# Patient Record
Sex: MALE | Race: WHITE
[De-identification: names, ages, dates, MRNs, and addresses within clinical notes are randomized per-mention and may not be internally consistent; named-entity substitution may affect disease eponyms.]

---

## 2018-03-08 ENCOUNTER — HOSPITAL ENCOUNTER (OUTPATIENT)
Age: 73
End: 2018-03-08
Payer: MEDICARE

## 2018-03-08 DIAGNOSIS — N40.1: ICD-10-CM

## 2018-03-08 DIAGNOSIS — R06.09: Primary | ICD-10-CM

## 2018-03-08 DIAGNOSIS — Z12.5: ICD-10-CM

## 2018-03-08 LAB
ALBUMIN LEVEL: 3.8 GM/DL (ref 3.4–5)
ALBUMIN/GLOB SERPL: 1 {RATIO} (ref 1.1–1.8)
ALP ISO SERPL-ACNC: 97 U/L (ref 46–116)
ALT SERPLBLD-CCNC: 33 U/L (ref 12–78)
ANION GAP SERPL CALC-SCNC: 10.7 MEQ/L (ref 5–15)
AST SERPL QL: 16 U/L (ref 15–37)
BILIRUBIN,TOTAL: 0.4 MG/DL (ref 0.2–1)
BUN SERPL-MCNC: 12 MG/DL (ref 7–18)
CALCIUM SPEC-MCNC: 8.9 MG/DL (ref 8.5–10.1)
CHLORIDE SPEC-SCNC: 102 MMOL/L (ref 98–107)
CO2 SERPL-SCNC: 31 MMOL/L (ref 21–32)
CREAT BLD-SCNC: 0.87 MG/DL (ref 0.7–1.3)
ESTIMATED GLOMERULAR FILT RATE: 86 ML/MIN (ref 60–?)
GFR (AFRICAN AMERICAN): 104 ML/MIN (ref 60–?)
GLOBULIN SER CALC-MCNC: 3.7 GM/DL (ref 1.3–3.2)
GLUCOSE: 101 MG/DL (ref 74–106)
HCT VFR BLD CALC: 50.7 % (ref 42–52)
HGB BLD-MCNC: 15.6 G/DL (ref 14.1–18)
MCHC RBC-ENTMCNC: 30.8 G/DL (ref 31.8–35.4)
MCV RBC: 94.8 FL (ref 80–94)
MEAN CORPUSCULAR HEMOGLOBIN: 29.2 PG (ref 27–31.2)
PLATELET # BLD: 342 K/MM3 (ref 142–424)
POTASSIUM: 4.7 MMOL/L (ref 3.5–5.1)
PROT SERPL-MCNC: 7.5 GM/DL (ref 6.4–8.2)
RBC # BLD AUTO: 5.35 M/MM3 (ref 4.6–6.2)
SODIUM SPEC-SCNC: 139 MMOL/L (ref 136–145)
TSH SERPL-ACNC: 0.57 UIU/ML (ref 0.36–3.74)
WBC # BLD AUTO: 5.6 K/MM3 (ref 4.8–10.8)

## 2018-03-08 PROCEDURE — 85025 COMPLETE CBC W/AUTO DIFF WBC: CPT

## 2018-03-08 PROCEDURE — 84443 ASSAY THYROID STIM HORMONE: CPT

## 2018-03-08 PROCEDURE — 71046 X-RAY EXAM CHEST 2 VIEWS: CPT

## 2018-03-08 PROCEDURE — 80053 COMPREHEN METABOLIC PANEL: CPT

## 2018-03-08 PROCEDURE — 36415 COLL VENOUS BLD VENIPUNCTURE: CPT

## 2018-03-08 PROCEDURE — G0103 PSA SCREENING: HCPCS

## 2018-04-17 ENCOUNTER — HOSPITAL ENCOUNTER (OUTPATIENT)
Age: 73
End: 2018-04-17
Payer: MEDICARE

## 2018-04-17 DIAGNOSIS — R06.09: Primary | ICD-10-CM

## 2018-04-17 PROCEDURE — 78452 HT MUSCLE IMAGE SPECT MULT: CPT

## 2018-04-17 PROCEDURE — A9502 TC99M TETROFOSMIN: HCPCS

## 2018-04-17 PROCEDURE — 93017 CV STRESS TEST TRACING ONLY: CPT

## 2018-05-04 ENCOUNTER — HOSPITAL ENCOUNTER (OUTPATIENT)
Age: 73
End: 2018-05-04
Payer: MEDICARE

## 2018-05-04 DIAGNOSIS — R60.0: Primary | ICD-10-CM

## 2018-05-04 PROCEDURE — 93306 TTE W/DOPPLER COMPLETE: CPT

## 2018-05-08 ENCOUNTER — HOSPITAL ENCOUNTER (OUTPATIENT)
Age: 73
End: 2018-05-08
Payer: MEDICARE

## 2018-05-08 DIAGNOSIS — G47.30: Primary | ICD-10-CM

## 2018-05-08 DIAGNOSIS — R94.39: ICD-10-CM

## 2018-05-08 DIAGNOSIS — R06.02: ICD-10-CM

## 2018-05-08 DIAGNOSIS — Z82.49: ICD-10-CM

## 2018-05-08 DIAGNOSIS — R06.83: ICD-10-CM

## 2018-05-08 PROCEDURE — 95806 SLEEP STUDY UNATT&RESP EFFT: CPT

## 2018-08-02 ENCOUNTER — HOSPITAL ENCOUNTER (OUTPATIENT)
Dept: HOSPITAL 22 - PT | Age: 73
Discharge: HOME | End: 2018-08-02
Payer: MEDICARE

## 2018-08-02 DIAGNOSIS — I87.2: Primary | ICD-10-CM

## 2018-08-02 PROCEDURE — 97140 MANUAL THERAPY 1/> REGIONS: CPT

## 2018-08-02 PROCEDURE — 97162 PT EVAL MOD COMPLEX 30 MIN: CPT

## 2018-08-02 PROCEDURE — 97760 ORTHOTIC MGMT&TRAING 1ST ENC: CPT

## 2018-12-17 ENCOUNTER — HOSPITAL ENCOUNTER (OUTPATIENT)
Age: 73
End: 2018-12-17
Payer: MEDICARE

## 2018-12-17 VITALS
OXYGEN SATURATION: 92 % | RESPIRATION RATE: 16 BRPM | SYSTOLIC BLOOD PRESSURE: 125 MMHG | DIASTOLIC BLOOD PRESSURE: 87 MMHG | HEART RATE: 83 BPM

## 2018-12-17 VITALS — HEART RATE: 66 BPM

## 2018-12-17 DIAGNOSIS — R06.09: Primary | ICD-10-CM

## 2018-12-17 PROCEDURE — 94060 EVALUATION OF WHEEZING: CPT

## 2018-12-17 PROCEDURE — 94640 AIRWAY INHALATION TREATMENT: CPT

## 2018-12-17 PROCEDURE — 94618 PULMONARY STRESS TESTING: CPT

## 2018-12-17 PROCEDURE — 94729 DIFFUSING CAPACITY: CPT

## 2018-12-17 PROCEDURE — 94726 PLETHYSMOGRAPHY LUNG VOLUMES: CPT

## 2019-03-12 ENCOUNTER — HOSPITAL ENCOUNTER (OUTPATIENT)
Age: 74
End: 2019-03-12
Payer: MEDICARE

## 2019-03-12 VITALS
RESPIRATION RATE: 22 BRPM | OXYGEN SATURATION: 78 % | HEART RATE: 82 BPM | SYSTOLIC BLOOD PRESSURE: 148 MMHG | DIASTOLIC BLOOD PRESSURE: 78 MMHG

## 2019-03-12 DIAGNOSIS — Z87.891: ICD-10-CM

## 2019-03-12 DIAGNOSIS — Z12.2: Primary | ICD-10-CM

## 2019-03-12 DIAGNOSIS — J44.9: ICD-10-CM

## 2019-03-12 PROCEDURE — 94618 PULMONARY STRESS TESTING: CPT

## 2019-04-01 ENCOUNTER — HOSPITAL ENCOUNTER (OUTPATIENT)
Dept: HOSPITAL 22 - PT | Age: 74
LOS: 59 days | Discharge: HOME | End: 2019-05-30
Payer: MEDICARE

## 2019-04-01 DIAGNOSIS — J44.9: Primary | ICD-10-CM

## 2019-04-01 PROCEDURE — G0424 PULMONARY REHAB W EXER: HCPCS

## 2019-04-26 ENCOUNTER — HOSPITAL ENCOUNTER (OUTPATIENT)
Age: 74
End: 2019-04-26
Payer: MEDICARE

## 2019-04-26 DIAGNOSIS — I25.10: ICD-10-CM

## 2019-04-26 DIAGNOSIS — R06.09: ICD-10-CM

## 2019-04-26 DIAGNOSIS — I10: Primary | ICD-10-CM

## 2019-04-26 PROCEDURE — 93306 TTE W/DOPPLER COMPLETE: CPT

## 2019-04-29 ENCOUNTER — HOSPITAL ENCOUNTER (OUTPATIENT)
Age: 74
End: 2019-04-29
Payer: MEDICARE

## 2019-04-29 DIAGNOSIS — I10: Primary | ICD-10-CM

## 2019-04-29 DIAGNOSIS — I25.10: ICD-10-CM

## 2019-04-29 DIAGNOSIS — R06.09: ICD-10-CM

## 2019-04-29 LAB
ANION GAP SERPL CALC-SCNC: 8.7 MEQ/L (ref 5–15)
BUN SERPL-MCNC: 21 MG/DL (ref 7–18)
CALCIUM SPEC-MCNC: 9.3 MG/DL (ref 8.5–10.1)
CHLORIDE SPEC-SCNC: 101 MMOL/L (ref 98–107)
CO2 SERPL-SCNC: 34 MMOL/L (ref 21–32)
CREAT BLD-SCNC: 1.21 MG/DL (ref 0.7–1.3)
ESTIMATED GLOMERULAR FILT RATE: 59 ML/MIN (ref 60–?)
GFR (AFRICAN AMERICAN): 71 ML/MIN (ref 60–?)
GLUCOSE: 143 MG/DL (ref 74–106)
POTASSIUM: 4.7 MMOL/L (ref 3.5–5.1)
SODIUM SPEC-SCNC: 139 MMOL/L (ref 136–145)

## 2019-04-29 PROCEDURE — 80048 BASIC METABOLIC PNL TOTAL CA: CPT

## 2019-04-29 PROCEDURE — 36415 COLL VENOUS BLD VENIPUNCTURE: CPT

## 2019-05-15 ENCOUNTER — HOSPITAL ENCOUNTER (OUTPATIENT)
Age: 74
End: 2019-05-15
Payer: MEDICARE

## 2019-05-15 DIAGNOSIS — I25.10: Primary | ICD-10-CM

## 2019-05-15 DIAGNOSIS — Z87.891: ICD-10-CM

## 2019-05-15 DIAGNOSIS — I10: ICD-10-CM

## 2019-05-15 DIAGNOSIS — R06.09: ICD-10-CM

## 2019-05-15 PROCEDURE — 93017 CV STRESS TEST TRACING ONLY: CPT

## 2019-05-15 PROCEDURE — 78452 HT MUSCLE IMAGE SPECT MULT: CPT

## 2019-05-15 PROCEDURE — A9502 TC99M TETROFOSMIN: HCPCS

## 2019-05-31 ENCOUNTER — HOSPITAL ENCOUNTER (OUTPATIENT)
Dept: HOSPITAL 22 - CATHLAB | Age: 74
Discharge: HOME | End: 2019-05-31
Payer: MEDICARE

## 2019-05-31 VITALS
HEART RATE: 66 BPM | OXYGEN SATURATION: 94 % | TEMPERATURE: 98.24 F | RESPIRATION RATE: 16 BRPM | DIASTOLIC BLOOD PRESSURE: 92 MMHG | SYSTOLIC BLOOD PRESSURE: 164 MMHG

## 2019-05-31 VITALS
OXYGEN SATURATION: 93 % | DIASTOLIC BLOOD PRESSURE: 67 MMHG | HEART RATE: 69 BPM | SYSTOLIC BLOOD PRESSURE: 112 MMHG | RESPIRATION RATE: 18 BRPM

## 2019-05-31 VITALS
HEART RATE: 70 BPM | DIASTOLIC BLOOD PRESSURE: 56 MMHG | OXYGEN SATURATION: 93 % | SYSTOLIC BLOOD PRESSURE: 102 MMHG | RESPIRATION RATE: 18 BRPM

## 2019-05-31 VITALS
OXYGEN SATURATION: 91 % | SYSTOLIC BLOOD PRESSURE: 107 MMHG | RESPIRATION RATE: 18 BRPM | DIASTOLIC BLOOD PRESSURE: 62 MMHG | HEART RATE: 61 BPM

## 2019-05-31 VITALS
SYSTOLIC BLOOD PRESSURE: 114 MMHG | HEART RATE: 70 BPM | DIASTOLIC BLOOD PRESSURE: 72 MMHG | OXYGEN SATURATION: 93 % | RESPIRATION RATE: 18 BRPM

## 2019-05-31 VITALS
RESPIRATION RATE: 18 BRPM | HEART RATE: 69 BPM | DIASTOLIC BLOOD PRESSURE: 67 MMHG | SYSTOLIC BLOOD PRESSURE: 111 MMHG | OXYGEN SATURATION: 93 %

## 2019-05-31 VITALS
SYSTOLIC BLOOD PRESSURE: 144 MMHG | RESPIRATION RATE: 18 BRPM | DIASTOLIC BLOOD PRESSURE: 87 MMHG | HEART RATE: 69 BPM | OXYGEN SATURATION: 93 %

## 2019-05-31 VITALS
DIASTOLIC BLOOD PRESSURE: 59 MMHG | RESPIRATION RATE: 18 BRPM | OXYGEN SATURATION: 93 % | SYSTOLIC BLOOD PRESSURE: 110 MMHG | HEART RATE: 69 BPM

## 2019-05-31 VITALS
DIASTOLIC BLOOD PRESSURE: 65 MMHG | RESPIRATION RATE: 18 BRPM | SYSTOLIC BLOOD PRESSURE: 109 MMHG | OXYGEN SATURATION: 95 % | HEART RATE: 65 BPM

## 2019-05-31 VITALS
HEART RATE: 65 BPM | RESPIRATION RATE: 18 BRPM | DIASTOLIC BLOOD PRESSURE: 59 MMHG | OXYGEN SATURATION: 93 % | SYSTOLIC BLOOD PRESSURE: 113 MMHG

## 2019-05-31 VITALS — HEART RATE: 67 BPM

## 2019-05-31 VITALS — BODY MASS INDEX: 30.5 KG/M2

## 2019-05-31 DIAGNOSIS — Z87.891: ICD-10-CM

## 2019-05-31 DIAGNOSIS — I10: ICD-10-CM

## 2019-05-31 DIAGNOSIS — I25.10: Primary | ICD-10-CM

## 2019-05-31 DIAGNOSIS — Z79.899: ICD-10-CM

## 2019-05-31 DIAGNOSIS — Z82.49: ICD-10-CM

## 2019-05-31 DIAGNOSIS — R94.39: ICD-10-CM

## 2019-05-31 LAB
ANION GAP SERPL CALC-SCNC: 12.3 MEQ/L (ref 5–15)
BUN SERPL-MCNC: 23 MG/DL (ref 7–18)
CALCIUM SPEC-MCNC: 9.3 MG/DL (ref 8.5–10.1)
CHLORIDE SPEC-SCNC: 100 MMOL/L (ref 98–107)
CO2 SERPL-SCNC: 31 MMOL/L (ref 21–32)
CREAT BLD-SCNC: 1.17 MG/DL (ref 0.7–1.3)
CREATININE CLEARANCE ESTIMATED: 76 ML/MIN (ref 50–200)
ESTIMATED GLOMERULAR FILT RATE: 61 ML/MIN (ref 60–?)
GFR (AFRICAN AMERICAN): 74 ML/MIN (ref 60–?)
GLUCOSE: 103 MG/DL (ref 74–106)
HCT VFR BLD CALC: 51.1 % (ref 42–52)
HGB BLD-MCNC: 17 G/DL (ref 14.1–18)
MCHC RBC-ENTMCNC: 33.3 G/DL (ref 31.8–35.4)
MCV RBC: 89.2 FL (ref 80–94)
MEAN CORPUSCULAR HEMOGLOBIN: 29.7 PG (ref 27–31.2)
PLATELET # BLD: 380 K/MM3 (ref 142–424)
POTASSIUM: 4.3 MMOL/L (ref 3.5–5.1)
RBC # BLD AUTO: 5.72 M/MM3 (ref 4.6–6.2)
SODIUM SPEC-SCNC: 139 MMOL/L (ref 136–145)
WBC # BLD AUTO: 7.2 K/MM3 (ref 4.8–10.8)

## 2019-05-31 PROCEDURE — 99152 MOD SED SAME PHYS/QHP 5/>YRS: CPT

## 2019-05-31 PROCEDURE — C1725 CATH, TRANSLUMIN NON-LASER: HCPCS

## 2019-05-31 PROCEDURE — 80048 BASIC METABOLIC PNL TOTAL CA: CPT

## 2019-05-31 PROCEDURE — 85025 COMPLETE CBC W/AUTO DIFF WBC: CPT

## 2019-05-31 PROCEDURE — C1769 GUIDE WIRE: HCPCS

## 2019-05-31 PROCEDURE — 93458 L HRT ARTERY/VENTRICLE ANGIO: CPT

## 2019-07-04 ENCOUNTER — HOSPITAL ENCOUNTER (OUTPATIENT)
Age: 74
End: 2019-07-04
Payer: MEDICARE

## 2019-07-04 DIAGNOSIS — R06.09: ICD-10-CM

## 2019-07-04 DIAGNOSIS — I25.10: ICD-10-CM

## 2019-07-04 DIAGNOSIS — I10: ICD-10-CM

## 2019-07-04 DIAGNOSIS — E78.5: Primary | ICD-10-CM

## 2019-07-04 LAB
ALBUMIN LEVEL: 3.5 GM/DL (ref 3.4–5)
ALP ISO SERPL-ACNC: 73 U/L (ref 46–116)
ALT SERPLBLD-CCNC: 30 U/L (ref 12–78)
AST SERPL QL: 21 U/L (ref 15–37)
BILIRUB DIRECT SERPL-MCNC: 0.1 MG/DL (ref 0–0.2)
BILIRUB INDIRECT SERPL-MCNC: 0.6 MG/DL (ref 0–0.9)
BILIRUBIN,TOTAL: 0.7 MG/DL (ref 0.2–1)
CHOLEST SPEC-SCNC: 176 MG/DL (ref 140–200)
HDLC SERPL-MCNC: 36 MG/DL (ref 27–67)
PROT SERPL-MCNC: 7.1 GM/DL (ref 6.4–8.2)
TRIGLYCERIDES: 127 MG/DL (ref 30–200)

## 2019-07-04 PROCEDURE — 80061 LIPID PANEL: CPT

## 2019-07-04 PROCEDURE — 80076 HEPATIC FUNCTION PANEL: CPT

## 2019-07-04 PROCEDURE — 36415 COLL VENOUS BLD VENIPUNCTURE: CPT

## 2019-08-01 ENCOUNTER — OFFICE VISIT (OUTPATIENT)
Dept: PHYSICAL THERAPY | Facility: CLINIC | Age: 74
End: 2019-08-01

## 2019-08-01 ENCOUNTER — TRANSCRIBE ORDERS (OUTPATIENT)
Dept: PHYSICAL THERAPY | Facility: CLINIC | Age: 74
End: 2019-08-01

## 2019-08-01 DIAGNOSIS — Z47.89 ORTHOPEDIC AFTERCARE: ICD-10-CM

## 2019-08-01 DIAGNOSIS — S63.652A SPRAIN OF METACARPOPHALANGEAL (MCP) JOINT OF RIGHT MIDDLE FINGER, INITIAL ENCOUNTER: Primary | ICD-10-CM

## 2019-08-01 DIAGNOSIS — S66.801A: Primary | ICD-10-CM

## 2019-08-01 PROCEDURE — L3935 FO NONTORSION JOINT CF: HCPCS | Performed by: PHYSICAL THERAPIST

## 2019-08-01 NOTE — PROGRESS NOTES
Miguel Cruz 1945   Diagnosis/ Surgery: Right long finger Sagittal band              Date Of Injury: 07/29/2019    Date Of Surgery:N/A    Hand Dominance: Right  History of Present Condition: Flipping something with right long finger, injured radial Sagittal band right long finger MCP joint.  Medical/Vocational History/ Medications: Retired, COPD    Pain: Ulna side long finger MCP joint    Edema: Moderate + long finger  Sensibility: WNL   Wound Status:N/A  ROM/ Strength/Test: Ulnar deviation of long finger with MCP flexion    Splinting:  · Patient was measure and fit with a custom fabricated hand based radial gutter splint with index/long finger MCP joint into full extension with IP joints free.   · Patient was instructed in wearing schedule, precautions and care of the splint during this visit.   · Patient was instructed in proper donning/doffing of splint.   Assessment:  · Patient was fitted and appropriate splint was fabricated this date.  · Patient reported that splint was comfortable and had no complications with the fit of the splint.  · Patient was instructed and patient verbalized understanding of precautions, wear and care of the splint.   · Patient demonstrated independent donning/doffing of splint during treatment today.  Goals:  · Patient was fitted properly with appropriate splint for diagnosis  · Patient was educated on precautions, wear schedule and care of splint  · Patient demonstrated independence with donning/doffing of the splint.  · Splint was provided to Protect Healing Structures, Restrict Mobility, Improve joint alignment.  Plan:  · No additional treatment is required for this patient at this time. The patient is therefore discharged from therapy.  · Patient advised to contact therapist with any additional questions or concerns regarding the fit and function of the splint.  · Patient will be seen for splint issues as needed   · Wear Instructions: Off for hygiene       PT SIGNATURE:  Zev Lancaster PT, CHT  DATE TREATMENT INITIATED: 8/1/2019    Physician Signature____________________________________ Date____________

## 2020-01-15 ENCOUNTER — HOSPITAL ENCOUNTER (OUTPATIENT)
Age: 75
End: 2020-01-15
Payer: MEDICARE

## 2020-01-15 DIAGNOSIS — I25.10: Primary | ICD-10-CM

## 2020-01-15 LAB
ALBUMIN LEVEL: 3.7 GM/DL (ref 3.4–5)
ALBUMIN/GLOB SERPL: 1.2 {RATIO} (ref 1.1–1.8)
ALP ISO SERPL-ACNC: 74 U/L (ref 46–116)
ALT SERPLBLD-CCNC: 17 U/L (ref 12–78)
ANION GAP SERPL CALC-SCNC: 11.6 MEQ/L (ref 5–15)
AST SERPL QL: 21 U/L (ref 15–37)
BILIRUBIN,TOTAL: 0.7 MG/DL (ref 0.2–1)
BUN SERPL-MCNC: 18 MG/DL (ref 7–18)
CALCIUM SPEC-MCNC: 8.8 MG/DL (ref 8.5–10.1)
CHLORIDE SPEC-SCNC: 103 MMOL/L (ref 98–107)
CHOLEST SPEC-SCNC: 114 MG/DL (ref 140–200)
CO2 SERPL-SCNC: 29 MMOL/L (ref 21–32)
CREAT BLD-SCNC: 0.93 MG/DL (ref 0.7–1.3)
ESTIMATED GLOMERULAR FILT RATE: 79 ML/MIN (ref 60–?)
GFR (AFRICAN AMERICAN): 96 ML/MIN (ref 60–?)
GLOBULIN SER CALC-MCNC: 3.1 GM/DL (ref 1.3–3.2)
GLUCOSE: 104 MG/DL (ref 74–106)
HCT VFR BLD CALC: 46.9 % (ref 42–52)
HDLC SERPL-MCNC: 48 MG/DL (ref 27–67)
HGB BLD-MCNC: 14.7 G/DL (ref 14.1–18)
MCHC RBC-ENTMCNC: 31.3 G/DL (ref 31.8–35.4)
MCV RBC: 93.8 FL (ref 80–94)
MEAN CORPUSCULAR HEMOGLOBIN: 29.3 PG (ref 27–31.2)
PLATELET # BLD: 303 K/MM3 (ref 142–424)
POTASSIUM: 4.6 MMOL/L (ref 3.5–5.1)
PROT SERPL-MCNC: 6.8 GM/DL (ref 6.4–8.2)
RBC # BLD AUTO: 5.01 M/MM3 (ref 4.6–6.2)
SODIUM SPEC-SCNC: 139 MMOL/L (ref 136–145)
TRIGLYCERIDES: 62 MG/DL (ref 30–200)
WBC # BLD AUTO: 5.8 K/MM3 (ref 4.8–10.8)

## 2020-01-15 PROCEDURE — 85025 COMPLETE CBC W/AUTO DIFF WBC: CPT

## 2020-01-15 PROCEDURE — 36415 COLL VENOUS BLD VENIPUNCTURE: CPT

## 2020-01-15 PROCEDURE — 80061 LIPID PANEL: CPT

## 2020-01-15 PROCEDURE — 80053 COMPREHEN METABOLIC PANEL: CPT

## 2021-05-07 ENCOUNTER — HOSPITAL ENCOUNTER (OUTPATIENT)
Age: 76
End: 2021-05-07
Payer: MEDICARE

## 2021-05-07 DIAGNOSIS — M54.31: Primary | ICD-10-CM

## 2021-05-07 PROCEDURE — 76376 3D RENDER W/INTRP POSTPROCES: CPT

## 2021-05-07 PROCEDURE — 72148 MRI LUMBAR SPINE W/O DYE: CPT

## 2021-11-02 ENCOUNTER — HOSPITAL ENCOUNTER (OUTPATIENT)
Age: 76
End: 2021-11-02
Payer: MEDICARE

## 2021-11-02 DIAGNOSIS — I25.10: Primary | ICD-10-CM

## 2021-11-02 PROCEDURE — 93017 CV STRESS TEST TRACING ONLY: CPT

## 2021-11-02 PROCEDURE — A9502 TC99M TETROFOSMIN: HCPCS

## 2021-11-02 PROCEDURE — 78452 HT MUSCLE IMAGE SPECT MULT: CPT

## 2021-11-15 ENCOUNTER — HOSPITAL ENCOUNTER (OUTPATIENT)
Age: 76
End: 2021-11-15
Payer: MEDICARE

## 2021-11-15 DIAGNOSIS — Z11.52: ICD-10-CM

## 2021-11-15 DIAGNOSIS — R94.39: ICD-10-CM

## 2021-11-15 DIAGNOSIS — I25.10: Primary | ICD-10-CM

## 2021-11-15 DIAGNOSIS — Z01.812: ICD-10-CM

## 2021-11-15 LAB
ANION GAP SERPL CALC-SCNC: 12.3 MEQ/L (ref 5–15)
BUN SERPL-MCNC: 14 MG/DL (ref 9–20)
CALCIUM SPEC-MCNC: 9.3 MG/DL (ref 8.4–10.2)
CHLORIDE SPEC-SCNC: 100 MMOL/L (ref 98–107)
CO2 SERPL-SCNC: 33 MMOL/L (ref 22–30)
CREAT BLD-SCNC: 0.7 MG/DL (ref 0.66–1.25)
ESTIMATED GLOMERULAR FILT RATE: 110 ML/MIN (ref 60–?)
GFR (AFRICAN AMERICAN): 133 ML/MIN (ref 60–?)
GLUCOSE: 103 MG/DL (ref 74–100)
HCT VFR BLD CALC: 47.2 % (ref 42–52)
HGB BLD-MCNC: 15.3 G/DL (ref 14.1–18)
MCHC RBC-ENTMCNC: 32.4 G/DL (ref 31.8–35.4)
MCV RBC: 94.4 FL (ref 80–94)
MEAN CORPUSCULAR HEMOGLOBIN: 30.6 PG (ref 27–31.2)
PLATELET # BLD: 356 K/MM3 (ref 142–424)
POTASSIUM: 5.3 MMOL/L (ref 3.5–5.1)
RBC # BLD AUTO: 5 M/MM3 (ref 4.6–6.2)
SODIUM SPEC-SCNC: 140 MMOL/L (ref 136–145)
WBC # BLD AUTO: 7.5 K/MM3 (ref 4.8–10.8)

## 2021-11-15 PROCEDURE — C9803 HOPD COVID-19 SPEC COLLECT: HCPCS

## 2021-11-15 PROCEDURE — 85025 COMPLETE CBC W/AUTO DIFF WBC: CPT

## 2021-11-15 PROCEDURE — U0005 INFEC AGEN DETEC AMPLI PROBE: HCPCS

## 2021-11-15 PROCEDURE — U0003 INFECTIOUS AGENT DETECTION BY NUCLEIC ACID (DNA OR RNA); SEVERE ACUTE RESPIRATORY SYNDROME CORONAVIRUS 2 (SARS-COV-2) (CORONAVIRUS DISEASE [COVID-19]), AMPLIFIED PROBE TECHNIQUE, MAKING USE OF HIGH THROUGHPUT TECHNOLOGIES AS DESCRIBED BY CMS-2020-01-R: HCPCS

## 2021-11-15 PROCEDURE — 36415 COLL VENOUS BLD VENIPUNCTURE: CPT

## 2021-11-15 PROCEDURE — 80048 BASIC METABOLIC PNL TOTAL CA: CPT

## 2021-11-17 ENCOUNTER — HOSPITAL ENCOUNTER (OUTPATIENT)
Dept: HOSPITAL 22 - CATHLAB | Age: 76
Discharge: HOME | End: 2021-11-17
Payer: MEDICARE

## 2021-11-17 VITALS
DIASTOLIC BLOOD PRESSURE: 66 MMHG | RESPIRATION RATE: 16 BRPM | OXYGEN SATURATION: 98 % | HEART RATE: 67 BPM | SYSTOLIC BLOOD PRESSURE: 114 MMHG

## 2021-11-17 VITALS
DIASTOLIC BLOOD PRESSURE: 74 MMHG | SYSTOLIC BLOOD PRESSURE: 117 MMHG | OXYGEN SATURATION: 94 % | RESPIRATION RATE: 18 BRPM | HEART RATE: 64 BPM

## 2021-11-17 VITALS
RESPIRATION RATE: 18 BRPM | SYSTOLIC BLOOD PRESSURE: 144 MMHG | OXYGEN SATURATION: 96 % | DIASTOLIC BLOOD PRESSURE: 81 MMHG | HEART RATE: 70 BPM

## 2021-11-17 VITALS
HEART RATE: 62 BPM | SYSTOLIC BLOOD PRESSURE: 132 MMHG | RESPIRATION RATE: 18 BRPM | DIASTOLIC BLOOD PRESSURE: 79 MMHG | OXYGEN SATURATION: 94 %

## 2021-11-17 VITALS
OXYGEN SATURATION: 93 % | HEART RATE: 66 BPM | DIASTOLIC BLOOD PRESSURE: 66 MMHG | SYSTOLIC BLOOD PRESSURE: 114 MMHG | RESPIRATION RATE: 18 BRPM

## 2021-11-17 VITALS
DIASTOLIC BLOOD PRESSURE: 65 MMHG | OXYGEN SATURATION: 98 % | SYSTOLIC BLOOD PRESSURE: 110 MMHG | HEART RATE: 67 BPM | RESPIRATION RATE: 18 BRPM

## 2021-11-17 VITALS
HEART RATE: 72 BPM | OXYGEN SATURATION: 96 % | SYSTOLIC BLOOD PRESSURE: 147 MMHG | RESPIRATION RATE: 18 BRPM | DIASTOLIC BLOOD PRESSURE: 74 MMHG

## 2021-11-17 VITALS
SYSTOLIC BLOOD PRESSURE: 117 MMHG | RESPIRATION RATE: 18 BRPM | HEART RATE: 63 BPM | OXYGEN SATURATION: 94 % | DIASTOLIC BLOOD PRESSURE: 67 MMHG

## 2021-11-17 VITALS
OXYGEN SATURATION: 94 % | DIASTOLIC BLOOD PRESSURE: 71 MMHG | RESPIRATION RATE: 18 BRPM | HEART RATE: 66 BPM | SYSTOLIC BLOOD PRESSURE: 119 MMHG

## 2021-11-17 VITALS
DIASTOLIC BLOOD PRESSURE: 64 MMHG | RESPIRATION RATE: 18 BRPM | HEART RATE: 66 BPM | OXYGEN SATURATION: 94 % | SYSTOLIC BLOOD PRESSURE: 116 MMHG

## 2021-11-17 VITALS
DIASTOLIC BLOOD PRESSURE: 88 MMHG | RESPIRATION RATE: 18 BRPM | TEMPERATURE: 98.4 F | SYSTOLIC BLOOD PRESSURE: 155 MMHG | OXYGEN SATURATION: 95 % | HEART RATE: 66 BPM

## 2021-11-17 VITALS — BODY MASS INDEX: 30.2 KG/M2

## 2021-11-17 DIAGNOSIS — I25.118: ICD-10-CM

## 2021-11-17 DIAGNOSIS — R94.39: Primary | ICD-10-CM

## 2021-11-17 DIAGNOSIS — I10: ICD-10-CM

## 2021-11-17 DIAGNOSIS — Z79.899: ICD-10-CM

## 2021-11-17 PROCEDURE — 99152 MOD SED SAME PHYS/QHP 5/>YRS: CPT

## 2021-11-17 PROCEDURE — C1725 CATH, TRANSLUMIN NON-LASER: HCPCS

## 2021-11-17 PROCEDURE — 93458 L HRT ARTERY/VENTRICLE ANGIO: CPT

## 2021-11-17 PROCEDURE — C1769 GUIDE WIRE: HCPCS

## 2023-01-16 ENCOUNTER — HOSPITAL ENCOUNTER (OUTPATIENT)
Age: 78
End: 2023-01-16
Payer: MEDICARE

## 2023-01-16 DIAGNOSIS — J42: Primary | ICD-10-CM

## 2023-01-16 DIAGNOSIS — E78.5: ICD-10-CM

## 2023-01-16 DIAGNOSIS — N40.1: ICD-10-CM

## 2023-01-16 LAB
ALBUMIN LEVEL: 4.3 G/DL (ref 3.5–5)
ALBUMIN/GLOB SERPL: 1.4 {RATIO} (ref 1.1–1.8)
ALP ISO SERPL-ACNC: 78 U/L (ref 38–126)
ALT SERPLBLD-CCNC: 31 U/L (ref 12–78)
ANION GAP SERPL CALC-SCNC: 10.6 MEQ/L (ref 5–15)
AST SERPL QL: 29 U/L (ref 17–59)
BILIRUBIN,TOTAL: 0.4 MG/DL (ref 0.2–1.3)
BUN SERPL-MCNC: 17 MG/DL (ref 9–20)
CALCIUM SPEC-MCNC: 9 MG/DL (ref 8.4–10.2)
CHLORIDE SPEC-SCNC: 101 MMOL/L (ref 98–107)
CHOLEST SPEC-SCNC: 130 MG/DL (ref 140–200)
CO2 SERPL-SCNC: 33 MMOL/L (ref 22–30)
CREAT BLD-SCNC: 0.9 MG/DL (ref 0.66–1.25)
ESTIMATED GLOMERULAR FILT RATE: 82 ML/MIN (ref 60–?)
GFR (AFRICAN AMERICAN): 99 ML/MIN (ref 60–?)
GLOBULIN SER CALC-MCNC: 3 G/DL (ref 1.3–3.2)
GLUCOSE: 67 MG/DL (ref 74–100)
HCT VFR BLD CALC: 47.3 % (ref 42–52)
HDLC SERPL-MCNC: 44 MG/DL (ref 40–60)
HGB BLD-MCNC: 15.3 G/DL (ref 14.1–18)
MCHC RBC-ENTMCNC: 32.3 G/DL (ref 31.8–35.4)
MCV RBC: 94.2 FL (ref 80–94)
MEAN CORPUSCULAR HEMOGLOBIN: 30.5 PG (ref 27–31.2)
PLATELET # BLD: 380 K/MM3 (ref 142–424)
POTASSIUM: 4.6 MMOL/L (ref 3.5–5.1)
PROT SERPL-MCNC: 7.3 G/DL (ref 6.3–8.2)
RBC # BLD AUTO: 5.02 M/MM3 (ref 4.6–6.2)
SODIUM SPEC-SCNC: 140 MMOL/L (ref 136–145)
TRIGLYCERIDES: 185 MG/DL (ref 30–150)
WBC # BLD AUTO: 7.7 K/MM3 (ref 4.8–10.8)

## 2023-01-16 PROCEDURE — 80053 COMPREHEN METABOLIC PANEL: CPT

## 2023-01-16 PROCEDURE — 36415 COLL VENOUS BLD VENIPUNCTURE: CPT

## 2023-01-16 PROCEDURE — 85025 COMPLETE CBC W/AUTO DIFF WBC: CPT

## 2023-01-16 PROCEDURE — 80061 LIPID PANEL: CPT

## 2024-12-30 ENCOUNTER — OFFICE VISIT (OUTPATIENT)
Dept: CARDIAC SURGERY | Facility: CLINIC | Age: 79
End: 2024-12-30
Payer: MEDICARE

## 2024-12-30 VITALS
TEMPERATURE: 97.1 F | WEIGHT: 213 LBS | SYSTOLIC BLOOD PRESSURE: 122 MMHG | DIASTOLIC BLOOD PRESSURE: 64 MMHG | OXYGEN SATURATION: 91 % | HEIGHT: 70 IN | HEART RATE: 73 BPM | BODY MASS INDEX: 30.49 KG/M2

## 2024-12-30 DIAGNOSIS — I35.9 AORTIC VALVE DISORDER: Primary | ICD-10-CM

## 2024-12-30 DIAGNOSIS — I71.22 ANEURYSM OF AORTIC ARCH WITHOUT RUPTURE: Primary | ICD-10-CM

## 2024-12-30 RX ORDER — FUROSEMIDE 20 MG/1
20 TABLET ORAL 2 TIMES DAILY
COMMUNITY

## 2024-12-30 RX ORDER — ROSUVASTATIN CALCIUM 20 MG/1
1 TABLET, COATED ORAL DAILY
COMMUNITY
Start: 2024-12-16

## 2024-12-30 RX ORDER — TAMSULOSIN HYDROCHLORIDE 0.4 MG/1
1 CAPSULE ORAL DAILY
COMMUNITY
Start: 2024-11-22

## 2024-12-30 RX ORDER — FLUTICASONE FUROATE, UMECLIDINIUM BROMIDE AND VILANTEROL TRIFENATATE 100; 62.5; 25 UG/1; UG/1; UG/1
1 POWDER RESPIRATORY (INHALATION) DAILY
COMMUNITY
Start: 2024-09-02

## 2024-12-30 RX ORDER — ASPIRIN 81 MG/1
81 TABLET ORAL DAILY
COMMUNITY

## 2024-12-30 RX ORDER — DIPHENOXYLATE HYDROCHLORIDE AND ATROPINE SULFATE 2.5; .025 MG/1; MG/1
TABLET ORAL DAILY
COMMUNITY

## 2024-12-30 RX ORDER — BISOPROLOL FUMARATE 5 MG/1
1 TABLET, FILM COATED ORAL DAILY
COMMUNITY
Start: 2024-12-02

## 2024-12-30 NOTE — PROGRESS NOTES
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  This is somewhat of an eccentric aneurysm and it appears the left subclavian is emanating from the aneurysm.  Unfortunately, the CT scan did not include the abdomen, pelvis or femoral arteries to assess for access.  I discussed with him that he may be a candidate for a branched thoracic endograft which would seal his aneurysm.  However, he pointed out that other doctors have asked him about symptomatology and he has no symptoms so he and his wife are questioning if he should have surgery.  I have indicated that until I see a complete thorough scan, I do not know if he is a candidate for surgery.  I further told them that one of the most common symptoms of aneurysms is absolutely no symptom until the rupture occurs.      There is no problem list on file for this patient.    Past Medical History:   Diagnosis Date    COPD (chronic obstructive pulmonary disease)     Hyperlipidemia     Hypertension      Past Surgical History:   Procedure Laterality Date    VEIN LIGATION AND STRIPPING Right     1990's       Current Outpatient Medications:     aspirin 81 MG EC tablet, Take 1 tablet by mouth Daily., Disp: , Rfl:     bisoprolol (ZEBeta) 5 MG tablet, Take 1 tablet by mouth Daily., Disp: , Rfl:     furosemide (LASIX) 20 MG tablet, Take 1 tablet by mouth 2 (Two) Times a Day., Disp: , Rfl:     LORATADINE PO, Take  by mouth., Disp: , Rfl:      multivitamin (MULTI VITAMIN DAILY PO), Take  by mouth Daily., Disp: , Rfl:     rosuvastatin (CRESTOR) 20 MG tablet, Take 1 tablet by mouth Daily., Disp: , Rfl:     tamsulosin (FLOMAX) 0.4 MG capsule 24 hr capsule, Take 1 capsule by mouth Daily., Disp: , Rfl:     Trelegy Ellipta 100-62.5-25 MCG/ACT inhaler, Inhale 1 puff Daily., Disp: , Rfl:   No Known Allergies  Social History     Socioeconomic History    Marital status:     Number of children: 1   Tobacco Use    Smoking status: Former     Current packs/day: 1.00     Average packs/day: 1 pack/day for 7.0 years (7.0 ttl pk-yrs)     Types: Cigarettes     Start date: 2018     Quit date: 1968    Smokeless tobacco: Never   Vaping Use    Vaping status: Never Used   Substance and Sexual Activity    Alcohol use: Never    Drug use: Never    Sexual activity: Defer     Family History   Problem Relation Age of Onset    Coronary artery disease Mother     Diabetes Mother     Stroke Father      Review of Systems   Constitutional: Negative for chills, decreased appetite, diaphoresis, fever, malaise/fatigue, night sweats, weight gain and weight loss.   HENT:  Negative for hoarse voice.    Eyes:  Negative for blurred vision, double vision and visual disturbance.   Cardiovascular:  Positive for dyspnea on exertion. Negative for chest pain, claudication, irregular heartbeat, leg swelling, near-syncope, orthopnea, palpitations, paroxysmal nocturnal dyspnea and syncope.   Respiratory:  Positive for shortness of breath. Negative for cough, hemoptysis, sputum production and wheezing.    Hematologic/Lymphatic: Negative for adenopathy and bleeding problem. Does not bruise/bleed easily.   Skin:  Negative for color change, nail changes, poor wound healing and rash.   Musculoskeletal:  Negative for back pain, falls and muscle cramps.   Gastrointestinal:  Negative for abdominal pain, dysphagia and heartburn.   Genitourinary:  Negative for flank pain.   Neurological:  Negative for brief  "paralysis, disturbances in coordination, dizziness, focal weakness, headaches, light-headedness, loss of balance, numbness, paresthesias, sensory change, vertigo and weakness.   Psychiatric/Behavioral:  Negative for depression and suicidal ideas.    Allergic/Immunologic: Negative for persistent infections.       Vitals:    12/30/24 0805   BP: 122/64   BP Location: Right arm   Patient Position: Sitting   Pulse: 73   Temp: 97.1 °F (36.2 °C)   SpO2: 91%   Weight: 96.6 kg (213 lb)   Height: 177.8 cm (70\")      Physical Exam   CONSTITUTIONAL: Alert and conversant, Well dressed, Well nourished, No acute distress  EYES: Sclera clean, Anicteric, Pupils equal  ENT: No nasal deviation, Trachea midline  NECK: No neck masses, Supple  LUNGS: No wheezing, Cough, non-congested  HEART: No rubs, No murmurs  GASTROINTESTINAL: Soft, non-distended, No masses, Non tender  to palpation, normal bowel sounds  NEURO: No motor deficits, No sensory deficits, Cranial Nerves 2 through 12 grossly intact  PSYCHIATRIC: Oriented to person, place and time, No memory deficits, Mood appropriate  VASCULAR: No carotid bruits, Femoral pulses palpable and symmetric  MUSKULOSKELETAL: No extremity trauma or extremity asymmetry    The ROS, past medical history, surgical history, family history, social history, and vitals were reviewed by myself and corrected as needed.      Labs/Imaging:  I have reviewed the CT scan of the chest demonstrating an aneurysm at the base of the left subclavian artery.  Smoking cessation was not discussed as he has not smoked since 1968.  He has no advance directive on file at this time.     Assessment/Plan:   The patient is an 79-year-old male who has an unusual appearing aneurysm which is not typical.  It is somewhat of a outpouching at the base underlying the left subclavian artery.  This might very well be amenable to endovascular repair with a branched thoracic endograft.  However, his scan does not cover the abdominal aorta " or the femoral and iliac vessels to assess for access potential.  I have recommended to the patient and his wife that we obtain a CT angiogram with three-dimensional reconstruction at the [facility] in [place] to assess whether he is a candidate for endovascular repair.  They do not know if they want to proceed with repair and they do not know that they even want to proceed with a CT scan.  I have indicated that an aneurysm can rupture without warning and they are aware that.  I have introduced them to my  and they are going to contemplate whether they would like a CT scan or not and then follow-up in my office in the next few days if they want to proceed or to just have no further interaction.    There is no problem list on file for this patient.      CC: MD Brigida Braden editing for Antoine Chacon MD       I, Antoine Chacon MD, have read and agree with the editing done by Brigida Gambino, .

## 2025-01-03 ENCOUNTER — PATIENT ROUNDING (BHMG ONLY) (OUTPATIENT)
Dept: CARDIAC SURGERY | Facility: CLINIC | Age: 80
End: 2025-01-03
Payer: MEDICARE

## 2025-01-03 DIAGNOSIS — I71.22 ANEURYSM OF AORTIC ARCH WITHOUT RUPTURE: Primary | ICD-10-CM

## 2025-01-03 NOTE — PROGRESS NOTES
January 3, 2025    Hello, may I speak with Miguel Cruz?    My name is Odilia      I am  with [facility]  [address]  [place] [address] [phone].    Before we get started may I verify your date of birth? 1945    I am calling to officially welcome you to our practice and ask about your recent visit. Is this a good time to talk? YES    Tell me about your visit with us. What things went well?  WENT GOOD        We're always looking for ways to make our patients' experiences even better. Do you have recommendations on ways we may improve?  NO    Overall were you satisfied with your first visit to our practice? YES       I appreciate you taking the time to speak with me today. Is there anything else I can do for you? NO      Thank you, and have a great day.

## 2025-01-20 ENCOUNTER — TELEPHONE (OUTPATIENT)
Dept: CARDIAC SURGERY | Facility: CLINIC | Age: 80
End: 2025-01-20
Payer: MEDICARE

## 2025-01-20 NOTE — TELEPHONE ENCOUNTER
Lidya with [facility] called, patient is there for CTA chest, abdomen, pelvis with 3D reconstruction and CTA of neck.  [facility] does not perform images with the 3D recon.  Advised them to cancel test, we will reschedule for [facility] in [place] and call patient with details.

## 2025-02-07 ENCOUNTER — HOSPITAL ENCOUNTER (OUTPATIENT)
Dept: CT IMAGING | Facility: HOSPITAL | Age: 80
Discharge: HOME | End: 2025-02-07
Payer: MEDICARE

## 2025-02-07 DIAGNOSIS — I35.9 AORTIC VALVE DISORDER: ICD-10-CM

## 2025-02-07 PROCEDURE — 70498 CT ANGIOGRAPHY NECK: CPT

## 2025-02-07 PROCEDURE — 74174 CTA ABD&PLVS W/CONTRAST: CPT

## 2025-02-07 PROCEDURE — 25510000001 IOPAMIDOL PER 1 ML

## 2025-02-07 PROCEDURE — 71275 CT ANGIOGRAPHY CHEST: CPT

## 2025-02-07 RX ORDER — IOPAMIDOL 755 MG/ML
150 INJECTION, SOLUTION INTRAVASCULAR
Status: COMPLETED | OUTPATIENT
Start: 2025-02-07 | End: 2025-02-07

## 2025-02-07 RX ADMIN — IOPAMIDOL 170 ML: 755 INJECTION, SOLUTION INTRAVENOUS at 12:10

## 2025-02-24 ENCOUNTER — TELEPHONE (OUTPATIENT)
Dept: CARDIAC SURGERY | Facility: CLINIC | Age: 80
End: 2025-02-24
Payer: MEDICARE

## 2025-02-24 NOTE — TELEPHONE ENCOUNTER
I s/w this appt and made him aware that Monie SALTER was waiting to speak with Dr. Chacon about the nodules that were noticed on the CT scan and as soon as Monie gets an answer from Dr. Chacon then we will call and tell him. Pt V/U - DLO

## 2025-03-14 ENCOUNTER — TELEPHONE (OUTPATIENT)
Dept: CARDIAC SURGERY | Facility: CLINIC | Age: 80
End: 2025-03-14
Payer: MEDICARE

## 2025-03-14 DIAGNOSIS — R91.1 PULMONARY NODULE SEEN ON IMAGING STUDY: Primary | ICD-10-CM

## 2025-03-14 PROBLEM — I71.22 ANEURYSM OF AORTIC ARCH WITHOUT RUPTURE: Status: ACTIVE | Noted: 2025-03-14

## 2025-03-14 NOTE — TELEPHONE ENCOUNTER
Telephone follow-up w/ spouse re: CT imaging    Reviewed CTA neck, chest, abdomen/pelvis performed 2/7/2025 w/ Dr. Chacon 3/10/2025.  Study imaging demonstrated known distal aortic arch fusiform aneurysm measuring 5.4 cm.  Ectasia of the ascending thoracic aorta measuring 3.9 cm with no evidence of dissection or other aneurysmal areas.  Chest imaging noted no thoracic adenopathy or pleural effusion.  Lung fields demonstrated left upper lobe group of peribronchial nodules with largest discrete nodule measuring 7 mm.  Dr. Chacon noted all nodules are subcentimeter and currently too small to be characterized by PET or too small for biopsy.  He recommends repeat CT chest in 6 months.  If any nodules appear increased in size (>1 cm) then NM PET would be pursued followed by consideration for biopsy depending upon the PET results.    Monie SALTER

## 2025-06-24 DIAGNOSIS — R91.1 PULMONARY NODULE SEEN ON IMAGING STUDY: Primary | ICD-10-CM

## 2025-07-03 ENCOUNTER — HOSPITAL ENCOUNTER (OUTPATIENT)
Dept: CT IMAGING | Facility: HOSPITAL | Age: 80
Discharge: HOME | End: 2025-07-03
Admitting: NURSE PRACTITIONER
Payer: MEDICARE

## 2025-07-03 DIAGNOSIS — R91.1 PULMONARY NODULE SEEN ON IMAGING STUDY: ICD-10-CM

## 2025-07-03 PROCEDURE — 71250 CT THORAX DX C-: CPT

## 2025-07-10 ENCOUNTER — OFFICE VISIT (OUTPATIENT)
Age: 80
End: 2025-07-10
Payer: MEDICARE

## 2025-07-10 VITALS
OXYGEN SATURATION: 91 % | SYSTOLIC BLOOD PRESSURE: 114 MMHG | HEART RATE: 66 BPM | BODY MASS INDEX: 30.16 KG/M2 | HEIGHT: 68 IN | DIASTOLIC BLOOD PRESSURE: 70 MMHG | WEIGHT: 199 LBS

## 2025-07-10 DIAGNOSIS — Z87.891 FORMER SMOKER: ICD-10-CM

## 2025-07-10 DIAGNOSIS — J44.9 COPD MIXED TYPE: ICD-10-CM

## 2025-07-10 DIAGNOSIS — R91.1 PULMONARY NODULE: Primary | ICD-10-CM

## 2025-07-10 DIAGNOSIS — J96.11 CHRONIC RESPIRATORY FAILURE WITH HYPOXIA: ICD-10-CM

## 2025-07-10 RX ORDER — AZITHROMYCIN 500 MG/1
TABLET, FILM COATED ORAL SEE ADMIN INSTRUCTIONS
COMMUNITY
Start: 2025-06-18

## 2025-07-10 RX ORDER — SPIRONOLACTONE 25 MG/1
TABLET ORAL
COMMUNITY
Start: 2025-07-08

## 2025-07-10 RX ORDER — ALBUTEROL SULFATE 90 UG/1
4 INHALANT RESPIRATORY (INHALATION) ONCE
Status: COMPLETED | OUTPATIENT
Start: 2025-07-10 | End: 2025-07-10

## 2025-07-10 RX ADMIN — ALBUTEROL SULFATE 4 PUFF: 90 INHALANT RESPIRATORY (INHALATION) at 15:57

## 2025-07-10 NOTE — PROGRESS NOTES
PULMONARY  NOTE    Chief Complaint     COPD, former smoker, pulmonary nodule, hypoxic respiratory failure, aortic aneurysm    History of Present Illness     79-year-old male referred for abnormal chest imaging    Long history of tobacco abuse up through roughly 2018    He has been diagnosed with COPD in the past  Previously saw Dr. Reaves and told he had stage III-IV COPD    He is on Trelegy with albuterol as needed  No recent exacerbation of COPD although he had a severe respiratory tract infection earlier this year  He had hemoptysis transiently with that respiratory tract infection  He was treated as an outpatient    He had a a CT scan of the chest in November 2024 that appears to have been labeled as a low-dose CT scan of the chest  However it was a contrast CT scan of the chest  Regardless, he has had subsequent chest imaging for aneurysmal dilation of the descending aorta and aortic arch and on his CT scan of the chest from July 3, 2025 he was noted to have a right lower lobe pulmonary nodule    Has been prescribed supplemental oxygen which he uses every night and during the day with exertion    Patient Active Problem List   Diagnosis    Pulmonary nodule (RLL ~7mm new from 11/2024)    Aneurysm of aortic arch without rupture    Stage IV (Very severe) COPD    Chronic respiratory failure with hypoxia    Former smoker (Stopped 2018)      No Known Allergies    Current Outpatient Medications:     aspirin 81 MG EC tablet, Take 1 tablet by mouth Daily., Disp: , Rfl:     azithromycin (ZITHROMAX) 500 MG tablet, Take  by mouth See Admin Instructions. Take 1 tablet by mouth on Monday, Wednesday, and Friday, Disp: , Rfl:     bisoprolol (ZEBeta) 5 MG tablet, Take 1 tablet by mouth Daily., Disp: , Rfl:     furosemide (LASIX) 20 MG tablet, Take 1 tablet by mouth 2 (Two) Times a Day., Disp: , Rfl:     LORATADINE PO, Take  by mouth., Disp: , Rfl:     multivitamin (MULTI VITAMIN DAILY PO), Take  by mouth Daily., Disp: ,  "Rfl:     rosuvastatin (CRESTOR) 20 MG tablet, Take 1 tablet by mouth Daily., Disp: , Rfl:     spironolactone (ALDACTONE) 25 MG tablet, , Disp: , Rfl:     tamsulosin (FLOMAX) 0.4 MG capsule 24 hr capsule, Take 1 capsule by mouth Daily., Disp: , Rfl:     Trelegy Ellipta 100-62.5-25 MCG/ACT inhaler, Inhale 1 puff Daily., Disp: , Rfl:   MEDICATION LIST AND ALLERGIES REVIEWED.    Family History   Problem Relation Age of Onset    Coronary artery disease Mother     Diabetes Mother     Stroke Father      Social History     Tobacco Use    Smoking status: Former     Current packs/day: 0.00     Average packs/day: 1 pack/day for 50.0 years (50.0 ttl pk-yrs)     Types: Cigarettes     Start date:      Quit date:      Years since quittin.5    Smokeless tobacco: Never   Vaping Use    Vaping status: Never Used   Substance Use Topics    Alcohol use: Never    Drug use: Never     Social History     Social History Narrative    Lives in [place], [place] with spouse      FAMILY AND SOCIAL HISTORY REVIEWED.    Review of Systems  IF PRESENT REFER TO SCANNED ROS SHEET FROM SAME DATE  OTHERWISE ROS OBTAINED AND NON-CONTRIBUTORY OVER HPI.    /70   Pulse 66   Ht 172.7 cm (68\")   Wt 90.3 kg (199 lb)   SpO2 91%   BMI 30.26 kg/m²   Physical Exam  Vitals and nursing note reviewed.   Constitutional:       General: He is not in acute distress.     Appearance: He is well-developed. He is not diaphoretic.   HENT:      Head: Normocephalic and atraumatic.   Neck:      Thyroid: No thyromegaly.   Cardiovascular:      Rate and Rhythm: Normal rate and regular rhythm.      Heart sounds: Normal heart sounds. No murmur heard.  Pulmonary:      Effort: Pulmonary effort is normal.      Breath sounds: No stridor.      Comments: Decreased breath sounds without wheezes  Lymphadenopathy:      Cervical: No cervical adenopathy.      Upper Body:      Right upper body: No supraclavicular or epitrochlear adenopathy.      Left upper body: No " supraclavicular or epitrochlear adenopathy.   Skin:     General: Skin is warm and dry.   Neurological:      Mental Status: He is alert.   Psychiatric:         Behavior: Behavior normal.         Results     CT scan of the chest from 11/19/2024 as well as CT scans of the chest from February 2025 and July 2025 reviewed on PACS  My interpretation as noted below    PFTs reveal very severe airway obstruction, no significant change in FEV1 after bronchodilator  No restriction but air trapping and a reduced diffusion capacity    Immunization History   Administered Date(s) Administered    COVID-19 (MODERNA) 12YRS+ (SPIKEVAX) 10/02/2023    COVID-19 (MODERNA) 1st,2nd,3rd Dose Monovalent 01/28/2021, 09/09/2021    COVID-19 (MODERNA) BIVALENT 12+YRS 11/22/2022    COVID-19 (MODERNA) Monovalent Original Booster 04/28/2022    COVID-19 (PFIZER) 12YRS+ (COMIRNATY) 11/19/2024     Problem List       ICD-10-CM ICD-9-CM   1. Pulmonary nodule (RLL ~7mm new from 11/2024)  R91.1 793.11   2. Chronic respiratory failure with hypoxia  J96.11 518.83     799.02   3. Stage IV (Very severe) COPD  J44.9 496   4. Former smoker (Stopped 2018)  Z87.891 V15.82       Discussion     We reviewed his test results  He has very severe, stage IV, chronic obstructive pulmonary disease  This is most likely COPD due to his long history of tobacco abuse  I will get an alpha-1 antitrypsin screen today, however    I recommended that he remain on Trelegy with albuterol as needed    I encouraged continued use of supplemental oxygen every night and during the day as needed to keep SaO2 %    He may benefit from pulmonary rehabilitation  I think they have a program in [place]    We reviewed his chest imaging  He has a right lower lobe pulmonary nodule that does not appear to have been present on his LDCT in November 2024  I have recommended a short interval, 3 months, CT scan of the chest  Will do that as a robotic protocol CT scan    I will plan to see him back  after that repeat CT scan of the chest    Moderate level of Medical Decision Making complexity based on 1 undiagnosed new problem or 2 stable chronic conditions, independent interpretation of tests, and/or prescription drug management     Basilio Merino MD  Note electronically signed    CC: Red Hollingsworth MD

## 2025-07-11 DIAGNOSIS — R91.1 PULMONARY NODULE: Primary | ICD-10-CM

## 2025-08-21 ENCOUNTER — OFFICE VISIT (OUTPATIENT)
Dept: CARDIAC SURGERY | Facility: CLINIC | Age: 80
End: 2025-08-21
Payer: MEDICARE

## 2025-08-21 VITALS
BODY MASS INDEX: 28.15 KG/M2 | OXYGEN SATURATION: 92 % | TEMPERATURE: 98.6 F | DIASTOLIC BLOOD PRESSURE: 70 MMHG | HEIGHT: 70 IN | WEIGHT: 196.6 LBS | SYSTOLIC BLOOD PRESSURE: 112 MMHG | HEART RATE: 58 BPM

## 2025-08-21 DIAGNOSIS — I71.22 ANEURYSM OF AORTIC ARCH WITHOUT RUPTURE: Primary | ICD-10-CM

## 2025-08-21 DIAGNOSIS — R91.1 PULMONARY NODULE: ICD-10-CM

## 2025-08-21 RX ORDER — SILDENAFIL 100 MG/1
1 TABLET, FILM COATED ORAL DAILY
COMMUNITY
Start: 2025-07-24